# Patient Record
Sex: MALE | Race: WHITE | NOT HISPANIC OR LATINO | Employment: OTHER | URBAN - METROPOLITAN AREA
[De-identification: names, ages, dates, MRNs, and addresses within clinical notes are randomized per-mention and may not be internally consistent; named-entity substitution may affect disease eponyms.]

---

## 2017-04-07 ENCOUNTER — OFFICE VISIT (OUTPATIENT)
Dept: MEDICAL GROUP | Facility: MEDICAL CENTER | Age: 52
End: 2017-04-07
Attending: FAMILY MEDICINE
Payer: MEDICAID

## 2017-04-07 VITALS
WEIGHT: 163 LBS | RESPIRATION RATE: 16 BRPM | HEART RATE: 100 BPM | DIASTOLIC BLOOD PRESSURE: 92 MMHG | BODY MASS INDEX: 25.58 KG/M2 | OXYGEN SATURATION: 99 % | HEIGHT: 67 IN | SYSTOLIC BLOOD PRESSURE: 140 MMHG | TEMPERATURE: 98.2 F

## 2017-04-07 DIAGNOSIS — K40.90 RIGHT INGUINAL HERNIA: ICD-10-CM

## 2017-04-07 DIAGNOSIS — Z20.2 VENEREAL DISEASE CONTACT: ICD-10-CM

## 2017-04-07 DIAGNOSIS — Z00.00 PERIODIC HEALTH ASSESSMENT, GENERAL SCREENING, ADULT: ICD-10-CM

## 2017-04-07 DIAGNOSIS — Z71.6 TOBACCO ABUSE COUNSELING: ICD-10-CM

## 2017-04-07 PROCEDURE — 99204 OFFICE O/P NEW MOD 45 MIN: CPT | Performed by: FAMILY MEDICINE

## 2017-04-07 PROCEDURE — 99203 OFFICE O/P NEW LOW 30 MIN: CPT | Performed by: FAMILY MEDICINE

## 2017-04-07 RX ORDER — METRONIDAZOLE 500 MG/1
2000 TABLET ORAL ONCE
Qty: 4 TAB | Refills: 0 | Status: SHIPPED | OUTPATIENT
Start: 2017-04-07 | End: 2017-04-07

## 2017-04-07 ASSESSMENT — PATIENT HEALTH QUESTIONNAIRE - PHQ9: CLINICAL INTERPRETATION OF PHQ2 SCORE: 0

## 2017-04-07 ASSESSMENT — PAIN SCALES - GENERAL: PAINLEVEL: NO PAIN

## 2017-04-07 NOTE — MR AVS SNAPSHOT
"Philip Jerez   2017 8:10 AM   Office Visit   MRN: [id number]    Department:  [facility]   Dept Phone:  [phone]    Description:  Male : 1965   Provider:  Tony Real M.D.           Reason for Visit     Establish Care           Allergies as of 2017     No Known Allergies      You were diagnosed with     Venereal disease contact   [267960]       Elevated blood pressure   [791665]       Periodic health assessment, general screening, adult   [202779]       Right inguinal hernia   [559412]       Tobacco abuse counseling   [099131]         Vital Signs     Blood Pressure Pulse Temperature Respirations Height Weight    140/92 mmHg 100 36.8 °C (98.2 °F) 16 1.702 m (5' 7\") 73.936 kg (163 lb)    Body Mass Index Oxygen Saturation Smoking Status             25.52 kg/m2 99% Current Every Day Smoker         Basic Information     Date Of Birth Sex Race Ethnicity Preferred Language    1965 Male White Non- English      Your appointments     2017  9:10 AM   Established Patient with Tony Real M.D.   [facility] ([facility]    [address] [place] [id number]   [phone]           You will be receiving a confirmation call a few days before your appointment from our automated call confirmation system.              Problem List              ICD-10-CM Priority Class Noted - Resolved    Venereal disease contact Z20.2   2017 - Present    Elevated blood pressure I10   2017 - Present    Periodic health assessment, general screening, adult Z00.00   2017 - Present    Right inguinal hernia K40.90   2017 - Present    Tobacco abuse counseling Z71.6   2017 - Present      Health Maintenance     Patient has no pending health maintenance at this time      Current Immunizations     No immunizations on file.      Below and/or attached are the medications your provider expects you to take. Review all of your home medications and newly ordered " medications with your provider and/or pharmacist. Follow medication instructions as directed by your provider and/or pharmacist. Please keep your medication list with you and share with your provider. Update the information when medications are discontinued, doses are changed, or new medications (including over-the-counter products) are added; and carry medication information at all times in the event of emergency situations     Allergies:  No Known Allergies          Medications  Valid as of: April 07, 2017 -  9:07 AM    Generic Name Brand Name Tablet Size Instructions for use    MetroNIDAZOLE (Tab) FLAGYL 500 MG Take 4 Tabs by mouth Once for 1 dose.        Permethrin (Cream) ELIMITE 5 % apply sparingly as directed        .                 Medicines prescribed today were sent to:     [id number] - [place], [place] - [address]    [address] [place] [address]    Phone: [phone] Fax: [phone]    Open 24 Hours?: No      Medication refill instructions:       If your prescription bottle indicates you have medication refills left, it is not necessary to call your provider’s office. Please contact your pharmacy and they will refill your medication.    If your prescription bottle indicates you do not have any refills left, you may request refills at any time through one of the following ways: The online No Paper Just Vapor system (except Urgent Care), by calling your provider’s office, or by asking your pharmacy to contact your provider’s office with a refill request. Medication refills are processed only during regular business hours and may not be available until the next business day. Your provider may request additional information or to have a follow-up visit with you prior to refilling your medication.   *Please Note: Medication refills are assigned a new Rx number when refilled electronically. Your pharmacy may indicate that no refills were authorized even though a new prescription for the same medication is available  at the pharmacy. Please request the medicine by name with the pharmacy before contacting your provider for a refill.        Your To Do List     Future Labs/Procedures Complete By Expires    CBC WITH DIFFERENTIAL  As directed 4/8/2018    CHLAMYDIA/GC PCR URINE OR SWAB  As directed 4/7/2018    COMP METABOLIC PANEL  As directed 4/8/2018    HEP C VIRUS ANTIBODY  As directed 4/8/2018    LIPID PROFILE  As directed 4/8/2018         Disqus Access Code: [email or web address]  Expires: 5/7/2017  9:07 AM    Your email address is not on file at [facility].  Email Addresses are required for you to sign up for Disqus, please contact [phone] to verify your personal information and to provide your email address prior to attempting to register for Disqus.    Philip Jerez  [address] [place] [address]    Disqus  A secure, online tool to manage your health information     [facility]’s Disqus® is a secure, online tool that connects you to your personalized health information from the privacy of your home -- day or night - making it very easy for you to manage your healthcare. Once the activation process is completed, you can even access your medical information using the Disqus luanne, which is available for free in the Apple Luanne store or Google Play store.     To learn more about Disqus, visit [email or web address]    There are two levels of access available (as shown below):   My Chart Features  [facility] Primary Care Doctor [facility]  Specialists [facility]  Urgent  Care Non-[facility] Primary Care Doctor   Email your healthcare team securely and privately 24/7 X X X    Manage appointments: schedule your next appointment; view details of past/upcoming appointments X      Request prescription refills. X      View recent personal medical records, including lab and immunizations X X X X   View health record, including health history, allergies, medications X X X X   Read reports about your outpatient visits, procedures, consult  and ER notes X X X X   See your discharge summary, which is a recap of your hospital and/or ER visit that includes your diagnosis, lab results, and care plan X X  X     How to register for Fangcang:  Once your e-mail address has been verified, follow the following steps to sign up for Fangcang.     1. Go to  [email or web address]  2. Click on the Sign Up Now box, which takes you to the New Member Sign Up page. You will need to provide the following information:  a. Enter your Fangcang Access Code exactly as it appears at the top of this page. (You will not need to use this code after you’ve completed the sign-up process. If you do not sign up before the expiration date, you must request a new code.)   b. Enter your date of birth.   c. Enter your home email address.   d. Click Submit, and follow the next screen’s instructions.  3. Create a Fangcang ID. This will be your Fangcang login ID and cannot be changed, so think of one that is secure and easy to remember.  4. Create a Fangcang password. You can change your password at any time.  5. Enter your Password Reset Question and Answer. This can be used at a later time if you forget your password.   6. Enter your e-mail address. This allows you to receive e-mail notifications when new information is available in Fangcang.  7. Click Sign Up. You can now view your health information.    For assistance activating your Fangcang account, call [phone]         Quit Tobacco Information     Do you want to quit using tobacco?    Quitting tobacco decreases risks of cancer, heart and lung disease, increases life expectancy, improves sense of taste and smell, and increases spending money, among other benefits.    If you are thinking about quitting, we can help.  • [facility] [place] [facility] Quit Tobacco Program: 705.494.1204  o Program occurs weekly for four weeks and includes pharmacist consultation on products to support quitting smoking or chewing tobacco. A provider referral is needed for  pharmacist consultation.  • Tobacco Users Help Hotline: 0-800-QUIT-NOW (831-8881) or https://[place].quitlogix.org/  o Free, confidential telephone and online coaching for [place] residents. Sessions are designed on a schedule that is convenient for you. Eligible clients receive free nicotine replacement therapy.  • Nationally: www.smokefree.gov  o Information and professional assistance to support both immediate and long-term needs as you become, and remain, a non-smoker. Smokefree.gov allows you to choose the help that best fits your needs.

## 2017-04-07 NOTE — PROGRESS NOTES
"Chief Complaint   Patient presents with   • Establish Care         HISTORY OF THE PRESENT ILLNESS: Patient is a 51 y.o. male. This pleasant patient is here today to establish care, be evaluated for trichomonas vaginalis exposure, elevated blood pressure, right inguinal hernia, tobacco use      Venereal disease contact  Patient presents today to be evaluated for Trichomonas vaginalis. His current sexual partner was noted on routine Pap smear to be positive for trichomonas. He is not aware of her having any vaginal irritative symptoms. He is circumcised and has not had urethritis, penile skin rash, pelvic pain.    Elevated blood pressure  Patient is ever been treated for hypertension. He does check his blood pressure at home with his girlfriend and reports it has been \"a little high\". He is not sure exactly which numbers he has been achieving. He denies current chest pain, chest pressure, palpitations, or unusual headaches.    Right inguinal hernia  Patient recalls a recurrent right inguinal bulging consistent with a hernia diagnosed back in 2007. Due to concerns about being put to sleep for surgical repair he has altered his routine tends to lift only standing on his left leg and wears a tight leather belts to reinforce his right lower quadrant. He reports he has not seen any protrusion at that area for several years. Notes normal formed bowel movements on a daily basis. Denies constipation, diarrhea.    Tobacco abuse counseling  Patient reports currently he will light and shoe on either 5 cigarettes or cigars over the course of the day. He intentionally does not inhale. He is not reporting a daily cough or sputum production.      Allergies: Review of patient's allergies indicates no known allergies.    Current Outpatient Prescriptions Ordered in [facility]   Medication Sig Dispense Refill   • permethrin (ELIMITE) 5 % CREA apply sparingly as directed 1 Tube 0     No current Epic-ordered facility-administered medications on " "file.       Past Medical History   Diagnosis Date   • Hernia 2006       History reviewed. No pertinent past surgical history.    Social History   Substance Use Topics   • Smoking status: Current Every Day Smoker -- 0.25 packs/day for 20 years     Types: Cigars   • Smokeless tobacco: Never Used   • Alcohol Use: No      Comment: quit 2007       Social history-, working recycling scrap      Family History   Problem Relation Age of Onset   • Heart Disease Mother    • Cancer Neg Hx    • Diabetes Neg Hx    • Stroke Neg Hx        Review of Systems   Constitutional: Negative for fever, chills, weight loss and malaise/fatigue.   HENT: Negative for ear pain, nosebleeds, congestion, sore throat and neck pain.    Eyes: Negative for blurred vision.   Respiratory: Negative for cough, sputum production, shortness of breath and wheezing.    Cardiovascular: Negative for chest pain, palpitations, orthopnea and leg swelling.   Gastrointestinal: Negative for heartburn, nausea, vomiting and abdominal pain.   Genitourinary: Negative for dysuria, urgency and frequency.   Musculoskeletal: Negative for myalgias, back pain and joint pain.   Skin: Negative for rash and itching.   Neurological: Negative for dizziness, tingling, tremors, sensory change, focal weakness and headaches.   Endo/Heme/Allergies: Does not bruise/bleed easily.   Psychiatric/Behavioral: Negative for depression, anxiety, or memory loss.            Exam: Blood pressure 140/92, pulse 100, temperature 36.8 °C (98.2 °F), resp. rate 16, height 1.702 m (5' 7\"), weight 73.936 kg (163 lb), SpO2 99 %.  General: Normal appearing. No distress.  HEENT: Normocephalic. Eyes conjunctiva clear lids without ptosis, pupils equal and reactive to light accommodation, ears normal shape and contour, canals are clear bilaterally, tympanic membranes are benign, nasal mucosa benign, oropharynx is without erythema, edema or exudates.   Neck: Supple without JVD or bruit. Thyroid is not " enlarged.  Pulmonary: Clear to ausculation.  Normal effort. No rales, ronchi, or wheezing.  Cardiovascular: Regular rate and rhythm without murmur. Carotid and radial pulses are intact and equal bilaterally.  Abdomen: Soft, nontender, nondistended. Normal bowel sounds. Liver and spleen are not palpable no palpable hernia impulse is noted in the right lower quadrant with cough or Valsalva either there or in the right side of the scrotum. Both testicles are descended  Neurologic: Intact light touch and strength bilaterally,normal speech, no tremor, normal gait.   Lymph: No cervical, supraclavicular or axillary lymph nodes are palpable  Skin: Warm and dry.  No obvious lesions.  Musculoskeletal: Normal gait. No extremity cyanosis, clubbing, or edema.  Psych: Normal mood and affect. Alert and oriented x3. Judgment and insight is normal.   -circumcised. No urethral discharge or redness. Phallus is unremarkable.  Please note that this dictation was created using voice recognition software. I have made every reasonable attempt to correct obvious errors, but I expect that there are errors of grammar and possibly content that I did not discover before finalizing the note.      Assessment/Plan  1. Venereal disease contact     2. Elevated blood pressure     3. Periodic health assessment, general screening, adult     4. Right inguinal hernia     5. Tobacco abuse counseling       Plan: 1. Cover with a single 2 g dose of metronidazole  2. Urine for chlamydia/GC and blood for HIV, RPR  3. Patient will check multiple home blood pressure readings, log those down and revisit with me in 2-3 weeks  4. Fasting lipids, CBC, CMP, hep C antibody  5. Advised to discontinue tobacco exposure, and rare meth use  6. Discussed symptoms of incarcerated hernia and patient will follow-up as needed

## 2019-01-01 NOTE — ASSESSMENT & PLAN NOTE
"Patient is ever been treated for hypertension. He does check his blood pressure at home with his girlfriend and reports it has been \"a little high\". He is not sure exactly which numbers he has been achieving. He denies current chest pain, chest pressure, palpitations, or unusual headaches.  "
Patient presents today to be evaluated for Trichomonas vaginalis. His current sexual partner was noted on routine Pap smear to be positive for trichomonas. He is not aware of her having any vaginal irritative symptoms. He is circumcised and has not had urethritis, penile skin rash, pelvic pain.  
Patient recalls a recurrent right inguinal bulging consistent with a hernia diagnosed back in 2007. Due to concerns about being put to sleep for surgical repair he has altered his routine tends to lift only standing on his left leg and wears a tight leather belts to reinforce his right lower quadrant. He reports he has not seen any protrusion at that area for several years. Notes normal formed bowel movements on a daily basis. Denies constipation, diarrhea.  
Patient reports currently he will light and shoe on either 5 cigarettes or cigars over the course of the day. He intentionally does not inhale. He is not reporting a daily cough or sputum production.  
Satisfactory